# Patient Record
Sex: MALE | Race: WHITE | Employment: STUDENT | URBAN - METROPOLITAN AREA
[De-identification: names, ages, dates, MRNs, and addresses within clinical notes are randomized per-mention and may not be internally consistent; named-entity substitution may affect disease eponyms.]

---

## 2023-12-28 ENCOUNTER — HOSPITAL ENCOUNTER (OUTPATIENT)
Age: 28
Discharge: HOME | End: 2023-12-28
Payer: MEDICAID

## 2023-12-28 VITALS
BODY MASS INDEX: 28.44 KG/M2 | RESPIRATION RATE: 18 BRPM | DIASTOLIC BLOOD PRESSURE: 81 MMHG | HEART RATE: 62 BPM | TEMPERATURE: 98 F | WEIGHT: 210 LBS | OXYGEN SATURATION: 99 % | HEIGHT: 72 IN | SYSTOLIC BLOOD PRESSURE: 132 MMHG

## 2023-12-28 DIAGNOSIS — R04.0 EPISTAXIS: Primary | ICD-10-CM

## 2023-12-28 NOTE — DISCHARGE INSTRUCTIONS
As discussed, avoid vigorously blowing her nose, coughing, sneezing, straining for bowel movement. Recommend sleeping somewhat elevated upright, sleep with humidifier. You may put a very small amount of Aquaphor on a Q-tip before you go to sleep at night and be very gentle about applying to inner mucous membranes of the nostrils. I would avoid placing tissue paper or \"plugging\" your nose with any paper as when you remove I believe you are reintroducing bleeding. If you begin to have a nosebleed again, apply nasal clamp as directed for at least 15 to 25 minutes and sit upright and apply ice and pressure to bridge of nose. If symptoms do not resolve in 15 to 20 minutes, you may follow-up with immediate care for further evaluation. I have given you the name of a PCP and an ENT physician that you may follow-up with.